# Patient Record
Sex: MALE | Race: WHITE | NOT HISPANIC OR LATINO | URBAN - METROPOLITAN AREA
[De-identification: names, ages, dates, MRNs, and addresses within clinical notes are randomized per-mention and may not be internally consistent; named-entity substitution may affect disease eponyms.]

---

## 2024-02-05 ENCOUNTER — TELEPHONE (OUTPATIENT)
Dept: PRIMARY CARE | Facility: CLINIC | Age: 29
End: 2024-02-05

## 2024-02-05 DIAGNOSIS — U07.1 COVID-19: Primary | ICD-10-CM

## 2024-02-05 RX ORDER — NIRMATRELVIR AND RITONAVIR 300-100 MG
3 KIT ORAL 2 TIMES DAILY
Qty: 30 TABLET | Refills: 0 | Status: SHIPPED | OUTPATIENT
Start: 2024-02-05 | End: 2024-02-10

## 2024-02-05 RX ORDER — CITALOPRAM 20 MG/1
1 TABLET, FILM COATED ORAL DAILY
COMMUNITY
Start: 2017-03-08

## 2024-02-05 NOTE — TELEPHONE ENCOUNTER
Pt called and stated that he was not feeling well last night. Pt states that he woke up this morning still not feeling well so he took a covid test and tested positive.  Pt would like to know if you could send in the Paxlovid for him. Pharmacy is [facility] on [address] and no allergies to any medications